# Patient Record
(demographics unavailable — no encounter records)

---

## 2025-06-02 NOTE — ASSESSMENT
[FreeTextEntry1] : 55 year old male with personal history of colon polyps presenting for colon cancer surveillance. He will be scheduled for a colonoscopy with Suflave prep for further evaluation. I have discussed the indications (including but not limited to ruling out inflammatory bowel disease, colorectal neoplasm, GI bleed, and AVM's), benefits, risks (including but not limited to reaction to the anesthesia, infection, bleeding, missed lesions, and perforation), and alternatives to colonoscopy with the patient. The patient understands all options and has agreed to have a colonoscopy and is medically optimized for the planned procedure.   Obtain labs from PCP for review  I, Dr. Helen Dao was present for the history and physical Patient has no GI complaints.  History of colon polyp Agree with colonoscopy Abdominal exam positive bowel sounds soft nontender

## 2025-06-02 NOTE — HISTORY OF PRESENT ILLNESS
[FreeTextEntry1] : MARIBEL XIE is a 55 year old male with PMH of HTN, HLD, presenting today for colon cancer screening. Last colonoscopy was 5 years ago with a practice in [place] but he cannot recall where or who the doctor was. He has a personal history of colon polyps. Denies any family history of colon cancer or polyps. He feels well and offers no complaints. He denies any abdominal pain, bowel changes, rectal bleeding. He moves his bowels regularly. No upper GI complaints. Appetite is good and weight is stable.

## 2025-06-02 NOTE — ADDENDUM
[FreeTextEntry1] : Jossie FELIX NP, am scribing for and the presence of Dr. Helen Dao  the following sections HISTORY OF PRESENT ILLNESSS, PAST MEDICAL/FAMILY/SOCIAL HISTORY; REVIEW OF SYSTEMS; VITAL SIGNS; PHYSICAL EXAM; DISPOSITION.

## 2025-06-02 NOTE — PHYSICAL EXAM
[Alert] : alert [Normal Voice/Communication] : normal voice/communication [Healthy Appearing] : healthy appearing [No Acute Distress] : no acute distress [Sclera] : the sclera and conjunctiva were normal [Hearing Threshold Finger Rub Not [place]] : hearing was normal [Normal Lips/Gums] : the lips and gums were normal [Oropharynx] : the oropharynx was normal [Normal Appearance] : the appearance of the neck was normal [No Neck Mass] : no neck mass was observed [No Respiratory Distress] : no respiratory distress [No Acc Muscle Use] : no accessory muscle use [Respiration, Rhythm And Depth] : normal respiratory rhythm and effort [Auscultation Breath Sounds / Voice Sounds] : lungs were clear to auscultation bilaterally [Heart Rate And Rhythm] : heart rate was normal and rhythm regular [Normal S1, S2] : normal S1 and S2 [Murmurs] : no murmurs [Bowel Sounds] : normal bowel sounds [Abdomen Tenderness] : non-tender [No Masses] : no abdominal mass palpated [Abdomen Soft] : soft [] : no hepatosplenomegaly [Oriented To Time, Place, And Person] : oriented to person, place, and time